# Patient Record
Sex: FEMALE
[De-identification: names, ages, dates, MRNs, and addresses within clinical notes are randomized per-mention and may not be internally consistent; named-entity substitution may affect disease eponyms.]

---

## 2018-02-19 ENCOUNTER — HOSPITAL ENCOUNTER (EMERGENCY)
Dept: HOSPITAL 14 - H.ER | Age: 53
Discharge: HOME | End: 2018-02-19
Payer: SELF-PAY

## 2018-02-19 VITALS — SYSTOLIC BLOOD PRESSURE: 133 MMHG | TEMPERATURE: 98.3 F | HEART RATE: 81 BPM | DIASTOLIC BLOOD PRESSURE: 78 MMHG

## 2018-02-19 VITALS — OXYGEN SATURATION: 95 %

## 2018-02-19 VITALS — RESPIRATION RATE: 18 BRPM

## 2018-02-19 DIAGNOSIS — J11.1: Primary | ICD-10-CM

## 2018-02-19 NOTE — ED PDOC
HPI: CCC, URI, Sore Throat


Time Seen by Provider: 02/19/18 16:05


Chief Complaint (Nursing): Flu-like Symptoms


Chief Complaint (Provider): Flu-like Symptoms


History Per: Patient


History/Exam Limitations: no limitations


Onset/Duration Of Symptoms: Days (x5)


Current Symptoms Are (Timing): Still Present


Additional Complaint(s): 


Patient reports 5 days of fever with runny nose, cough, congestion, and body 

aches. Otherwise: (-) sore throat, (-) SOB, (-) chest pain, (-) N/V/D, (-) 

abdominal pain, (-) flank pain, (-) urinary symptoms, (-) recent travel, (-) 

sick contacts.





PMD: Dr. Ricardo 





Past Medical History


Reviewed: Historical Data, Nursing Documentation, Vital Signs


Vital Signs: 


 Last Vital Signs











Temp  98.3 F   02/19/18 17:51


 


Pulse  81   02/19/18 17:51


 


Resp  18   02/19/18 17:51


 


BP  133/78   02/19/18 17:51


 


Pulse Ox  99   02/19/18 17:51














- Family History


Family History: States: Unknown Family Hx





- Home Medications


Home Medications: 


 Ambulatory Orders











 Medication  Instructions  Recorded


 


Guaifenesin 400 mg PO QID #20 tablet 02/19/18


 


Ibuprofen [Motrin Tab] 600 mg PO QID PRN #20 tab 02/19/18














- Allergies


Allergies/Adverse Reactions: 


 Allergies











Allergy/AdvReac Type Severity Reaction Status Date / Time


 


No Known Allergies Allergy   Verified 05/02/17 08:59














Review of Systems


ROS Statement: Except As Marked, All Systems Reviewed And Found Negative


Constitutional: Positive for: Fever, Other (Body aches)


ENT: Positive for: Nose Discharge, Nose Congestion.  Negative for: Throat Pain


Cardiovascular: Negative for: Chest Pain


Respiratory: Positive for: Cough.  Negative for: Shortness of Breath


Gastrointestinal: Negative for: Nausea, Vomiting, Abdominal Pain, Diarrhea


Genitourinary Female: Negative for: Dysuria, Frequency





Physical Exam





- Reviewed


Nursing Documentation Reviewed: Yes


Vital Signs Reviewed: Yes





- Physical Exam


Comments: 


GENERAL APPEARANCE: Patient is awake, alert, oriented x 3, in no acute distress.


SKIN:  Warm, dry; (-) cyanosis, (-) rash.  


EYES:  (-) conjunctival pallor, (-) scleral icterus, (-) conjunctival 

hemorrhage.


ENMT:  Mucous membranes moist.  TMs:  (-) erythema.  Airway patent:  (-) 

stridor.  Pharynx:  (-) erythema, (-) exudate.


NECK:  (-) tenderness, (-) stiffness, (-) meningismus, (-) lymphadenopathy.


CHEST AND RESPIRATORY:  (-) accessory muscle use.  Lungs:  (-) rales, (-) 

rhonchi, (-) wheezes, (-) rub; breath sounds equal bilaterally.


HEART AND CARDIOVASCULAR:  (-) irregularity; (-) murmur, (-) gallop, (-) rub.


ABDOMEN AND GI:  Soft; (-) tenderness, (-) guarding; (-) organomegaly; (-) mass

; (-) CVA tenderness. 


EXTREMITIES:  (-) deformity; (-) cellulitis, (-) lymphangitis; (-) subungual 

hemorrhage; (-) edema.


NEURO AND PSYCH:  Mental status as above; (-) focal findings. 





- ECG


O2 Sat by Pulse Oximetry: 95 (RA)


Pulse Ox Interpretation: Normal





Medical Decision Making


Medical Decision Making: 





Clinical Impression: Flu-like illness





Time: 16:56


Initial Plan:


--Chest X-Ray





CXR : NAD, as read by PA.





Based on history, exam and diagnostic results plan will be for outpatient 

follow up with PMD. Counseled patient regarding likely diagnosis of flu. 

Advised that symptoms have been ongoing for 5 days, and therefore patient is 

out of therapeutic window to give Tamiflu. Encouraged symptomatic treatment 

with intake of fluids, bed rest, Motrin and Tylenol. There is agreement to 

discharge plan. Advised patient to return to the emergency room at any time for 

any new or worsening symptoms.





Patient states he fully agrees with and understands discharge instructions. 

States that he agrees with the plan and disposition. Verbalized and repeated 

discharge instructions and plan. I have given the patient opportunity to ask 

any additional questions.





--------------------------------------------------------------------------------

-----------------


Scribe Attestation:   


Documented by Kylah Isaac, acting as a scribe for Ashtyn Chatterjee PA-C





Provider Scribe Attestation:


All medical record entries made by the Scribe were at my direction and 

personally dictated by me. I have reviewed the chart and agree that the record 

accurately reflects my personal performance of the history, physical exam, 

medical decision making, and the department course for this patient. I have 

also personally directed, reviewed, and agree with the discharge instructions 

and disposition.





Disposition





- Clinical Impression


Clinical Impression: 


 Influenza








- Patient ED Disposition


Is Patient to be Admitted: No


Counseled Patient/Family Regarding: Diagnosis, Need For Followup, Rx Given





- Disposition


Disposition: Routine/Home


Disposition Time: 17:30


Condition: STABLE


Additional Instructions: 


Thank you for letting us take care of you today. You were treated for 

influenza. The emergency medical care you received today was directed at your 

acute symptoms. If you were prescribed any medication, please fill it and take 

as directed. It may take several days for your symptoms to resolve. Return to 

the Emergency Department if your symptoms worsen, do not improve, or if you 

have any other problems.





Please contact your doctor in 2 days for re-evaluation and follow up. Bring any 

paperwork you were given at discharge with you along with any medications you 

are taking to your follow up visit. Our treatment cannot replace ongoing 

medical care by a primary care provider (PCP) outside of the emergency 

department.





Thank you for allowing the [facility] team to be part of your care today.


Prescriptions: 


Guaifenesin 400 mg PO QID #20 tablet


Ibuprofen [Motrin Tab] 600 mg PO QID PRN #20 tab


 PRN Reason: Fever >100.4 F


Instructions:  Flu, Adult (DC)


Forms:  [facility] (English), [facility] ED School/Work Excuse


Print Language: Dominican





- PA / NP / Resident Statement


MD/DO has reviewed & agrees with the documentation as recorded.